# Patient Record
Sex: MALE | Race: WHITE | URBAN - METROPOLITAN AREA
[De-identification: names, ages, dates, MRNs, and addresses within clinical notes are randomized per-mention and may not be internally consistent; named-entity substitution may affect disease eponyms.]

---

## 2017-01-15 ENCOUNTER — ANESTHESIA EVENT (OUTPATIENT)
Dept: EMERGENCY MEDICINE | Facility: CLINIC | Age: 57
End: 2017-01-15
Payer: COMMERCIAL

## 2017-01-15 ENCOUNTER — APPOINTMENT (OUTPATIENT)
Dept: GENERAL RADIOLOGY | Facility: CLINIC | Age: 57
End: 2017-01-15
Attending: EMERGENCY MEDICINE
Payer: COMMERCIAL

## 2017-01-15 ENCOUNTER — HOSPITAL ENCOUNTER (EMERGENCY)
Facility: CLINIC | Age: 57
Discharge: HOME | End: 2017-01-15
Attending: EMERGENCY MEDICINE | Admitting: EMERGENCY MEDICINE
Payer: COMMERCIAL

## 2017-01-15 ENCOUNTER — ANESTHESIA (OUTPATIENT)
Dept: EMERGENCY MEDICINE | Facility: CLINIC | Age: 57
End: 2017-01-15
Payer: COMMERCIAL

## 2017-01-15 VITALS
HEART RATE: 62 BPM | OXYGEN SATURATION: 99 % | SYSTOLIC BLOOD PRESSURE: 110 MMHG | RESPIRATION RATE: 17 BRPM | WEIGHT: 175 LBS | TEMPERATURE: 97.6 F | DIASTOLIC BLOOD PRESSURE: 73 MMHG

## 2017-01-15 DIAGNOSIS — S42.401A CLOSED FRACTURE DISLOCATION OF RIGHT ELBOW, INITIAL ENCOUNTER: ICD-10-CM

## 2017-01-15 PROCEDURE — 25000125 ZZHC RX 250: Performed by: NURSE ANESTHETIST, CERTIFIED REGISTERED

## 2017-01-15 PROCEDURE — 73070 X-RAY EXAM OF ELBOW: CPT | Mod: RT

## 2017-01-15 PROCEDURE — 24600 TX CLSD ELBOW DISLC W/O ANES: CPT

## 2017-01-15 PROCEDURE — 40000671 ZZH STATISTIC ANESTHESIA CASE

## 2017-01-15 PROCEDURE — 96374 THER/PROPH/DIAG INJ IV PUSH: CPT | Mod: 59

## 2017-01-15 PROCEDURE — 37000011 ZZH ANESTHESIA WARD SERVICE: Performed by: NURSE ANESTHETIST, CERTIFIED REGISTERED

## 2017-01-15 PROCEDURE — 40000940 XR ELBOW PORT RT 2 VW: Mod: RT

## 2017-01-15 PROCEDURE — 99284 EMERGENCY DEPT VISIT MOD MDM: CPT | Mod: 25 | Performed by: EMERGENCY MEDICINE

## 2017-01-15 PROCEDURE — 99285 EMERGENCY DEPT VISIT HI MDM: CPT | Mod: 25

## 2017-01-15 PROCEDURE — 25000132 ZZH RX MED GY IP 250 OP 250 PS 637: Performed by: EMERGENCY MEDICINE

## 2017-01-15 PROCEDURE — 25000125 ZZHC RX 250: Performed by: EMERGENCY MEDICINE

## 2017-01-15 PROCEDURE — 24600 TX CLSD ELBOW DISLC W/O ANES: CPT | Performed by: EMERGENCY MEDICINE

## 2017-01-15 PROCEDURE — 25800025 ZZH RX 258: Performed by: NURSE ANESTHETIST, CERTIFIED REGISTERED

## 2017-01-15 RX ORDER — OXYCODONE AND ACETAMINOPHEN 5; 325 MG/1; MG/1
1-2 TABLET ORAL EVERY 6 HOURS PRN
Qty: 24 TABLET | Refills: 0 | Status: SHIPPED | OUTPATIENT
Start: 2017-01-15

## 2017-01-15 RX ORDER — HYDROMORPHONE HYDROCHLORIDE 1 MG/ML
0.5 INJECTION, SOLUTION INTRAMUSCULAR; INTRAVENOUS; SUBCUTANEOUS ONCE
Status: COMPLETED | OUTPATIENT
Start: 2017-01-15 | End: 2017-01-15

## 2017-01-15 RX ORDER — SODIUM CHLORIDE, SODIUM LACTATE, POTASSIUM CHLORIDE, CALCIUM CHLORIDE 600; 310; 30; 20 MG/100ML; MG/100ML; MG/100ML; MG/100ML
INJECTION, SOLUTION INTRAVENOUS CONTINUOUS PRN
Status: DISCONTINUED | OUTPATIENT
Start: 2017-01-15 | End: 2017-01-15

## 2017-01-15 RX ORDER — OXYCODONE AND ACETAMINOPHEN 5; 325 MG/1; MG/1
2 TABLET ORAL ONCE
Status: COMPLETED | OUTPATIENT
Start: 2017-01-15 | End: 2017-01-15

## 2017-01-15 RX ORDER — PROPOFOL 10 MG/ML
INJECTION, EMULSION INTRAVENOUS PRN
Status: DISCONTINUED | OUTPATIENT
Start: 2017-01-15 | End: 2017-01-15

## 2017-01-15 RX ORDER — LIDOCAINE HYDROCHLORIDE 10 MG/ML
INJECTION, SOLUTION INFILTRATION; PERINEURAL PRN
Status: DISCONTINUED | OUTPATIENT
Start: 2017-01-15 | End: 2017-01-15

## 2017-01-15 RX ADMIN — LIDOCAINE HYDROCHLORIDE 30 MG: 10 INJECTION, SOLUTION INFILTRATION; PERINEURAL at 17:30

## 2017-01-15 RX ADMIN — PROPOFOL 50 MG: 10 INJECTION, EMULSION INTRAVENOUS at 17:30

## 2017-01-15 RX ADMIN — SODIUM CHLORIDE, POTASSIUM CHLORIDE, SODIUM LACTATE AND CALCIUM CHLORIDE: 600; 310; 30; 20 INJECTION, SOLUTION INTRAVENOUS at 17:20

## 2017-01-15 RX ADMIN — PROPOFOL 50 MG: 10 INJECTION, EMULSION INTRAVENOUS at 17:31

## 2017-01-15 RX ADMIN — OXYCODONE HYDROCHLORIDE AND ACETAMINOPHEN 2 TABLET: 5; 325 TABLET ORAL at 18:28

## 2017-01-15 RX ADMIN — HYDROMORPHONE HYDROCHLORIDE 0.5 MG: 1 INJECTION, SOLUTION INTRAMUSCULAR; INTRAVENOUS; SUBCUTANEOUS at 18:01

## 2017-01-15 RX ADMIN — PROPOFOL 20 MG: 10 INJECTION, EMULSION INTRAVENOUS at 17:32

## 2017-01-15 NOTE — ED PROVIDER NOTES
History     Chief Complaint   Patient presents with     Arm Pain     fell during [employer] today and injured right elbow with obvious deformity. + pulse below deformity.      HPI  Krishan Gamez is a 56 year old male who fell into the right elbow while playing broom ball a short time ago.  He has pain, swelling and deformity at the elbow.  Sharp, severe, nonradiating pain which is constant and aggravated by any shoulder range of motion.  He is unable to move for use the elbow.  No distal CMS abnormality.  No other acute injury or trauma.  No other acute complaints or concerns.     I have reviewed the Medications, Allergies, Past Medical and Surgical History, and Social History in the Epic system.  There is no problem list on file for this patient.  PMH: CAD and MI  No past surgical history on file.  No current facility-administered medications for this encounter.     Current Outpatient Prescriptions   Medication     oxyCODONE-acetaminophen (PERCOCET) 5-325 MG per tablet     No Known Allergies     Social History   Substance Use Topics     Smoking status: No     Alcohol Use: Not on file     No family history on file.    Review of Systems  As mentioned above in the history present illness.  All other systems were reviewed and are negative.    Physical Exam   BP: 144/86 mmHg  Pulse: 62  Heart Rate: 58  Temp: 97.6  F (36.4  C)  Resp: 16  Weight: 79.379 kg (175 lb)  SpO2: 99 %  Physical Exam   Constitutional: He is oriented to person, place, and time. He appears well-developed and well-nourished. No distress.   HENT:   Head: Normocephalic and atraumatic.   Mouth/Throat: Oropharynx is clear and moist. No oropharyngeal exudate.   Eyes: Conjunctivae and EOM are normal.   Neck: Normal range of motion. Neck supple. No spinous process tenderness present. No tracheal deviation present. No thyromegaly present.   Cardiovascular: Normal rate, regular rhythm, normal heart sounds and intact distal pulses.  Exam reveals no gallop and  "no friction rub.    No murmur heard.  Pulmonary/Chest: Effort normal and breath sounds normal. No respiratory distress. He has no wheezes. He has no rales.   Musculoskeletal: Normal range of motion. He exhibits tenderness ( right elbow pain and swelling with deformity consistent with dislocation; skin intact; distal CMS function intact). He exhibits no edema (no pretibial edema).   Neurological: He is alert and oriented to person, place, and time. He has normal strength. No sensory deficit.   Skin: Skin is warm and dry. No rash noted. He is not diaphoretic. No erythema. No pallor.   Psychiatric: He has a normal mood and affect. His behavior is normal.   Nursing note and vitals reviewed.      ED Course   Orthopedic injury tx  Date/Time: 1/15/2017 5:35 PM  Performed by: BECKY CHIN  Authorized by: BECKY CHIN  Consent: Verbal consent obtained. Written consent obtained.  Risks and benefits: risks, benefits and alternatives were discussed  Consent given by: patient and spouse  Patient understanding: patient states understanding of the procedure being performed  Patient consent: the patient's understanding of the procedure matches consent given  Imaging studies: imaging studies available  Patient identity confirmed: arm band  Time out: Immediately prior to procedure a \"time out\" was called to verify the correct patient, procedure, equipment, support staff and site/side marked as required.  Injury location: elbow  Location details: right elbow  Injury type: dislocation  Dislocation type: posterior  Pre-procedure neurovascular assessment: neurovascularly intact  Local anesthesia used: no  Patient sedated: yes (procedural sedation by the CRNA/anesthesia service)  Sedatives: propofol  Analgesia: see MAR for details  Vitals: Vital signs were monitored during sedation.  Manipulation performed: yes  Reduction method: traction and counter traction  Reduction successful: yes  X-ray confirmed reduction: " yes  Immobilization: splint  Splint type: long arm  Supplies used: Ortho-Glass  Post-procedure neurovascular assessment: post-procedure neurovascularly intact  Patient tolerance: Patient tolerated the procedure well with no immediate complications           I independently reviewed the X-rays: Agree with the Radiologist's interpretation.  Results for orders placed or performed during the hospital encounter of 01/15/17   Elbow XR, 2 views, right    Narrative    ELBOW RIGHT TWO VIEW 1/15/2017 4:46 PM     COMPARISON: None.    HISTORY: Fell during broomball, obvious deformity.      Impression    IMPRESSION: There is posterior dislocation of the right forearm at the  distal right humerus. There is a questionable fracture through the  medial aspect of the proximal end of the right ulna. No other definite  fractures noted.   XR Elbow Port Right 2 Views    Narrative    ELBOW PORTABLE RIGHT TWO VIEW 1/15/2017 6:04 PM     HISTORY: Post-reduction films.    COMPARISON: Earlier same day.    FINDINGS: Overlying splint obscures detail. The elbow dislocation is  realigned.      Impression    IMPRESSION: Realigned elbow dislocation, possibly avulsion fracture  adjacent to the lateral epicondyle. Possible fracture of the proximal  ulna again evident.    ALENA DE LEON MD     I independently reviewed the X-rays: Agree with the Radiologist's interpretation.         Labs Ordered and Resulted from Time of ED Arrival Up to the Time of Departure from the ED - No data to display    Medications   HYDROmorphone (PF) (DILAUDID) injection 0.5 mg (0.5 mg Intravenous Given 1/15/17 1801)   oxyCODONE-acetaminophen (PERCOCET) 5-325 MG per tablet 2 tablet (2 tablets Oral Given 1/15/17 1828)       Assessments & Plan (with Medical Decision Making)   posterclosed posterior right elbow dislocation with fractures of the humeral lateral epicondyle and ulna.  CMS function intact.  Closed reduction was successfully performed under procedural sedation by the  CRNA.  Orthopedic  referral was made.  He was placed in a sling and given instructions for supportive care.  Rx Percocet to use if needed for pain refractory to ibuprofen. Patient was provided instructions for supportive care and will return as needed for worsened condition or worsening symptoms, or new problems or concerns.    I have reviewed the nursing notes.    I have reviewed the findings, diagnosis, plan and need for follow up with the patient.    New Prescriptions    OXYCODONE-ACETAMINOPHEN (PERCOCET) 5-325 MG PER TABLET    Take 1-2 tablets by mouth every 6 hours as needed for moderate to severe pain       Final diagnoses:   Closed fracture dislocation of right elbow, initial encounter       1/15/2017   [facility] EMERGENCY DEPARTMENT      Sukhi Mercedes MD  01/15/17 0217
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
## 2017-01-15 NOTE — ED AVS SNAPSHOT
[facility] [place] [facility]    [address] [address]    Phone:  [phone]    Fax:  [phone]                                       Krishan Gamez   MRN: [id number]    Department:  [facility] [place] [facility]   Date of Visit:  1/15/2017           After Visit Summary Signature Page     I have received my discharge instructions, and my questions have been answered. I have discussed any challenges I see with this plan with the nurse or doctor.    ..........................................................................................................................................  Patient/Patient Representative Signature      ..........................................................................................................................................  Patient Representative Print Name and Relationship to Patient    ..................................................               ................................................  Date                                            Time    ..........................................................................................................................................  Reviewed by Signature/Title    ...................................................              ..............................................  Date                                                            Time

## 2017-01-15 NOTE — ED NOTES
Pt fell landing on left elbow playing [employer].   Obviously deformity to left elbow.   Pt signed consent for left elbow reduction and CRNA in room with pt discussing procedure.

## 2017-01-15 NOTE — ED AVS SNAPSHOT
[facility]    [address] [address]    Phone:  [phone]    Fax:  [phone]                                       Krishan Gamez   MRN: [id number]    Department:  [facility]   Date of Visit:  1/15/2017           Patient Information     Date Of Birth          1960        Your diagnoses for this visit were:     Closed fracture dislocation of right elbow, initial encounter        You were seen by Sukhi Mercedes MD.      Follow-up Information     Follow up with [facility].    Specialty:  EMERGENCY MEDICINE    Why:  If symptoms worsen, or new problems or concerns    Contact information:    [address] [place] [id number] [phone]    Additional information:    The medical center is located at   [address] [place] [address]. (between [address] in [place], [place] miles north   of [place]).        Follow up with Orthopaedics, [facility].    Why:  Referral was made for you, they will call you to arrange follow-up later this week    Contact information:    [address] [place] [id number]  [phone]        Discharge References/Attachments     ELBOW DISLOCATION (ENGLISH)      24 Hour Appointment Hotline       To make an appointment at any [facility] clinic, call [phone] ([phone]). If you don't have a family doctor or clinic, we will help you find one. [facility] clinics are conveniently located to serve the needs of you and your family.          ED Discharge Orders     ORTHO  REFERRAL       [facility] is referring you to the Orthopedic  Services at [facility].       The  Representative will assist you in the coordination of your Orthopedic and Musculoskeletal Care as prescribed by your physician.    The  Representative will call you within 1 business day to help schedule your appointment, or you may  contact the [facility] Representative at:    All areas ~ [phone]     Type of Referral : Surgical / Specialist       Timeframe requested: 3 - 5 days    Coverage of these services is subject to the terms and limitations of your health insurance plan.  Please call member services at your health plan with any benefit or coverage questions.      If X-rays, CT or MRI's have been performed, please contact the facility where they were done to arrange for , prior to your scheduled appointment.  Please bring this referral request to your appointment and present it to your specialist.                     Review of your medicines      START taking        Dose / Directions Last dose taken    oxyCODONE-acetaminophen 5-325 MG per tablet   Commonly known as:  PERCOCET   Dose:  1-2 tablet   Quantity:  24 tablet        Take 1-2 tablets by mouth every 6 hours as needed for moderate to severe pain   Refills:  0                Prescriptions were sent or printed at these locations (1 Prescription)                   Other Prescriptions                Printed at Department/Unit printer (1 of 1)         oxyCODONE-acetaminophen (PERCOCET) 5-325 MG per tablet                Procedures and tests performed during your visit     Elbow XR, 2 views, right    Orthopedic injury treatment    XR Elbow Port Right 2 Views      Orders Needing Specimen Collection     None      Pending Results     Date and Time Order Name Status Description    1/15/2017 1635 Elbow XR, 2 views, right Preliminary             Pending Culture Results     No orders found from 1/14/2017 to 1/16/2017.       Test Results from your hospital stay           1/15/2017  5:00 PM - Interface, Radiant Ib      Narrative     ELBOW RIGHT TWO VIEW 1/15/2017 4:46 PM     COMPARISON: None.    HISTORY: Fell during [facility], obvious deformity.        Impression     IMPRESSION: There is posterior dislocation of the right forearm at the  distal right humerus. There is a questionable  "fracture through the  medial aspect of the proximal end of the right ulna. No other definite  fractures noted.         1/15/2017  6:15 PM - Interface, Radiant Ib      Narrative     ELBOW PORTABLE RIGHT TWO VIEW 1/15/2017 6:04 PM     HISTORY: Post-reduction films.    COMPARISON: Earlier same day.    FINDINGS: Overlying splint obscures detail. The elbow dislocation is  realigned.        Impression     IMPRESSION: Realigned elbow dislocation, possibly avulsion fracture  adjacent to the lateral epicondyle. Possible fracture of the proximal  ulna again evident.    ALENA DE LEON MD                Thank you for choosing [facility]       Thank you for choosing [facility] for your care. Our goal is always to provide you with excellent care. Hearing back from our patients is one way we can continue to improve our services. Please take a few minutes to complete the written survey that you may receive in the mail after you visit with us. Thank you!        [employer] Information     [employer] lets you send messages to your doctor, view your test results, renew your prescriptions, schedule appointments and more. To sign up, go to [email or web address] . Click on \"Log in\" on the left side of the screen, which will take you to the Welcome page. Then click on \"Sign up Now\" on the right side of the page.     You will be asked to enter the access code listed below, as well as some personal information. Please follow the directions to create your username and password.     Your access code is: [email or web address]  Expires: 4/15/2017  6:38 PM     Your access code will  in 90 days. If you need help or a new code, please call your [facility] clinic or [phone].        Care EveryWhere ID     This is your Care EveryWhere ID. This could be used by other organizations to access your [facility] medical records  [id number]        After Visit Summary       This is your record. Keep this with you and show to your community pharmacist(s) and doctor(s) " at your next visit.

## 2017-01-15 NOTE — ED NOTES
fell during [employer] today and injured right elbow with obvious deformity. + pulse below deformity.

## 2017-01-16 NOTE — ANESTHESIA CARE TRANSFER NOTE
Patient: Krishan Gamez    Reduction of Right Elbow Dislocation  Additional Information* No procedures listed *    Diagnosis: * No pre-op diagnosis entered *  Diagnosis Additional Information: No value filed.    Anesthesia Type:   MAC     Note:  Airway :Room Air  Patient transferred to:Emergency Department  Comments: Patient's VSS. Spontaneous respirations. Patient awake and oriented. IV patent. Report to RN.      Vitals: (Last set prior to Anesthesia Care Transfer)              Electronically Signed By: GEORGETTE Reed CRNA  January 15, 2017  6:15 PM

## 2017-01-16 NOTE — ANESTHESIA POSTPROCEDURE EVALUATION
Patient: Krishan Gamez    Reduction of Right Elbow Dislocation  Additional Information* No procedures listed *    Diagnosis:* No pre-op diagnosis entered *  Diagnosis Additional Information: No value filed.    Anesthesia Type:  MAC    Note:  Anesthesia Post Evaluation    Patient location during evaluation: Bedside  Patient participation: Able to fully participate in evaluation  Level of consciousness: awake and alert  Pain management: adequate  Airway patency: patent  Cardiovascular status: acceptable  Respiratory status: acceptable  Hydration status: acceptable  PONV: none     Anesthetic complications: None          Last vitals:  Filed Vitals:    01/15/17 1740 01/15/17 1745 01/15/17 1800   BP: 127/85 107/75 113/71   Pulse:      Temp:      Resp: 20 7 17   SpO2: 99% 99% 99%       Electronically Signed By: GEORGETTE Reed CRNA  January 15, 2017  6:16 PM

## 2017-01-19 ENCOUNTER — OFFICE VISIT (OUTPATIENT)
Dept: ORTHOPEDICS | Facility: CLINIC | Age: 57
End: 2017-01-19
Payer: COMMERCIAL

## 2017-01-19 VITALS — WEIGHT: 175 LBS | HEART RATE: 62 BPM | RESPIRATION RATE: 16 BRPM

## 2017-01-19 DIAGNOSIS — S53.104A: Primary | ICD-10-CM

## 2017-01-19 PROBLEM — M54.12 CERVICAL RADICULOPATHY: Status: ACTIVE | Noted: 2017-01-19

## 2017-01-19 PROCEDURE — 99203 OFFICE O/P NEW LOW 30 MIN: CPT | Performed by: ORTHOPAEDIC SURGERY

## 2017-01-19 RX ORDER — INDOMETHACIN 25 MG/1
25 CAPSULE ORAL
Qty: 30 CAPSULE | Refills: 1 | Status: SHIPPED | OUTPATIENT
Start: 2017-01-19

## 2017-01-19 ASSESSMENT — PAIN SCALES - GENERAL: PAINLEVEL: MODERATE PAIN (5)

## 2017-01-19 NOTE — NURSING NOTE
Chief Complaint   Patient presents with     Trauma     Patient is here for right elbow injury. DOI: 1/15/2017 He fell into the right elbow while playing broom ball        Initial Pulse 62  Resp 16  Wt 79.379 kg (175 lb) There is no height on file to calculate BMI.  BP completed using cuff size: NA (Not Taken)  Rafael Cortez MA

## 2017-01-19 NOTE — PROGRESS NOTES
SUBJECTIVE:  Krishan Gamez is a 56 year old male who is seen at the consultation request of Dr. Mercedes for right elbow injury that occurred 3 days ago. DOI: 1/15/17. He had a posterolateral dislocation of right elbow that was reduced in the ER. Patient presents to the clinic wearing a splint.   Onset: Following acute injury. Mechanism of injury: Fall while playing broom ball.     Present symptoms: swelling where the splint was not wrapped. He had a few hours where he had numbness along thumb that only occurred 3 days ago but has resolved.   Mitigating Factors:  No Treatment tried to date  Symptoms have been intermittent since that time.  Prior history of related problems: no prior problems with this area in the past.    Patients past medical, surgical, social and family histories reviewed. Past medical history notable for: reviewed on the up to date problem list in the chart  PAST MEDICAL HISTORY: No past medical history on file.    PAST SURGICAL HISTORY: No past surgical history on file.    FAMILY HISTORY: No family history on file.    SOCIAL HISTORY:   Social History   Substance Use Topics     Smoking status: Not on file     Smokeless tobacco: Not on file     Alcohol Use: Not on file     REVIEW OF SYSTEMS:  CONSTITUTIONAL:NEGATIVE for fever, chills, change in weight  INTEGUMENTARY/SKIN: NEGATIVE for worrisome rashes, moles or lesions  MUSCULOSKELETAL:See HPI above  NEURO: NEGATIVE for weakness, dizziness or paresthesias    EXAM:  Pulse 62  Resp 16  Wt 79.379 kg (175 lb)  GENERAL APPEARANCE: healthy, alert and no distress   GAIT: NORMAL  SKIN: no suspicious lesions or rashes  NEURO: normal strength and tone, sentation intact, reflexes normal, DTR symmetrically normal in upper extremities and DTR symmetrically normal in lower extremities  PSYCH:  mentation appears normal and affect normal/bright    MUSCULOSKELETAL:  RIGHT ELBOW:  Splint was removed  Inspection: significant swelling of the elbow  Tender: lateral  epicondyle, anteromedial elbow just anterior to the medial epicondyle   Range of Motion: 45 degrees of extension and 100 degrees of flexion   Mild valgus laxity   With gentle testing he appears to have some lateral varus laxity.   Strength: good wrist dorsiflexor strength with no pain over the lateral epicondyle   Pain with resisted pronation    X-RAY INTEPRETATION: Obtained 1/15/17 of the RIGHT ELBOW PORT: 2-views, reviewed in the office with the patient by myself today and show a posterolateral dislocation, with subsequent reduction.  there is a small avulsion off the radial side presumably from the lateral epicondyle. There may be a small fracture off the nonarticular portion of the medial condyle of the elbow.     ASSESSMENT:  1. Elbow dislocation with likely posterolateral instability    PLAN:  We discussed the treatment options. I recommended wearing a sling full-time with hinged elbow brace wear. All questions were answered. The patient understands and has elected to proceed.   Hinged elbow brace ordered.   Until he gets the brace he will wear a sling and remove it throughout the day for gentle ROM exercises.  Tylenol as needed at recommended dosage.   Ice as needed.   Activity restrictions: No lifting, pushing with right hand.     Meds:  Prescription: Indocin ordered to prevent heterotopic ossification.     Return to clinic in 2 weeks.    SHANICE Bowen MD  Dept. Orthopedic Surgery  [facility]    This document serves as a record of the services and decisions personally performed and made by Dr. SHANICE Bowen MD. It was created on his behalf by Melissa Inman, a trained medical scribe. The creation of this record is based on the provider's personal observations and the statements of the patient. This document has been checked and approved by the attending provider.   Melissa Inman 2:55 PM 1/19/2017

## 2017-01-19 NOTE — MR AVS SNAPSHOT
After Visit Summary   1/19/2017    Krishan Gamez    MRN: [id number]           Patient Information     Date Of Birth          1960        Visit Information        Provider Department      1/19/2017 2:45 PM Dinesh Bowen MD [facility]        Care Instructions    Please remember to call and schedule a follow up appointment if one was recommended at your earliest convenience.   Orthopedics CLINIC HOURS TELEPHONE NUMBER   Dinesh Bowen M.D.      Rafael Cortez  Medical Assistant Tuesday 8 am -5 pm    Wednesday 8 am -1 pm    Thursday 8 am -5 pm   Specialty schedulers:   [phone] to make an appointment with any Specialty Provider.   Main Clinic:   [phone] to make an appointment with your primary provider   Urgent Care locations:    [facility] Monday-Friday 5 pm - 9 pm  Saturday-Sunday 9 am -5pm      Monday-Friday 11 am - 9 pm  Saturday 9 am - 5 pm [phone]     If SURGERY has been recommended, please call our Specialty Schedulers at [phone] to schedule your procedure.    If you need a medication refill, please contact your pharmacy. Please allow 3 business days for your refill to be completed.  Use [employer] Software (secure e-mail communication and access to your chart) to send a message or to make an appointment. Please ask how you can sign up for [email or web address].        Follow-ups after your visit        Who to contact     If you have questions or need follow up information about today's clinic visit or your schedule please contact [facility] directly at [phone].  Normal or non-critical lab and imaging results will be communicated to you by MyChart, letter or phone within 4 business days after the clinic has received the results. If you do not hear from us within 7 days, please contact the clinic through Welcome Fundshart or phone. If you have a critical or abnormal lab result, we will notify you by phone as soon as  "possible.  Submit refill requests through Microarrays or call your pharmacy and they will forward the refill request to us. Please allow 3 business days for your refill to be completed.          Additional Information About Your Visit        Microarrays Information     Microarrays lets you send messages to your doctor, view your test results, renew your prescriptions, schedule appointments and more. To sign up, go to [email or web address] . Click on \"Log in\" on the left side of the screen, which will take you to the Welcome page. Then click on \"Sign up Now\" on the right side of the page.     You will be asked to enter the access code listed below, as well as some personal information. Please follow the directions to create your username and password.     Your access code is: [email or web address]  Expires: 4/15/2017  6:38 PM     Your access code will  in 90 days. If you need help or a new code, please call your [place] clinic or [phone].        Care EveryWhere ID     This is your Care EveryWhere ID. This could be used by other organizations to access your [place] medical records  [id number]        Your Vitals Were     Pulse Respirations                62 16           Blood Pressure from Last 3 Encounters:   01/15/17 110/73    Weight from Last 3 Encounters:   17 79.379 kg (175 lb)   01/15/17 79.379 kg (175 lb)              Today, you had the following     No orders found for display       Primary Care Provider    Pcp Unknown Verified       No address on file        Thank you!     Thank you for [facility]  [facility]. Our goal is always to provide you with excellent care. Hearing back from our patients is one way we can continue to improve our services. Please take a few minutes to complete the written survey that you may receive in the mail after your visit with us. Thank you!             Your Updated Medication List - Protect others around you: Learn how to safely use, store and throw " away your medicines at www.disposemymeds.org.          This list is accurate as of: 1/19/17  2:50 PM.  Always use your most recent med list.                   Brand Name Dispense Instructions for use    oxyCODONE-acetaminophen 5-325 MG per tablet    PERCOCET    24 tablet    Take 1-2 tablets by mouth every 6 hours as needed for moderate to severe pain

## 2017-01-19 NOTE — PATIENT INSTRUCTIONS
Please remember to call and schedule a follow up appointment if one was recommended at your earliest convenience.   Orthopedics CLINIC HOURS TELEPHONE NUMBER   Dinesh Bowen M.D.      Rafael Cortez  Medical Assistant Tuesday 8 am -5 pm    Wednesday 8 am -1 pm    Thursday 8 am -5 pm   Specialty schedulers:   [phone] to make an appointment with any Specialty Provider.   Main Clinic:   [phone] to make an appointment with your primary provider   Urgent Care locations:    [facility] Monday-Friday 5 pm - 9 pm  Saturday-Sunday 9 am -5pm      Monday-Friday 11 am - 9 pm  Saturday 9 am - 5 pm [phone]     If SURGERY has been recommended, please call our Specialty Schedulers at [phone] to schedule your procedure.    If you need a medication refill, please contact your pharmacy. Please allow 3 business days for your refill to be completed.  Use [email or web address] (secure e-mail communication and access to your chart) to send a message or to make an appointment. Please ask how you can sign up for Tervela.

## 2017-02-07 NOTE — PROGRESS NOTES
HISTORY OF PRESENT ILLNESS:    Krishan Gamez is a 56 year old male who is seen in follow up for his 1/15/17 right elbow dislocation. He had a posterolateral dislocation of right elbow that was reduced in the ER. He has not received the hinged elbow brace. He has been using the brace and doing gentle ROM exercises.     He reports that the Indocin caused him to have some difficulty sleeping.     It has been approximately 3 weeks since the injury.   Onset: Following acute injury. Mechanism of injury: Fall while playing broom ball.     PHYSICAL EXAM:  Pulse 79  Resp 16  Wt 79.379 kg (175 lb)  There is no height on file to calculate BMI.   GENERAL APPEARANCE: healthy, alert and no distress   SKIN: no suspicious lesions or rashes  NEURO: Normal strength and tone, mentation intact and speech normal  VASCULAR:  good pulses, and cappillary refill   LYMPH: no lymphadenopathy   PSYCH:  mentation appears normal and affect normal/bright    MSK:  ROM: 10 short of full extension and 105 degrees of flexion ; full pronation and supination  Fairly good stability with varus and valgus stress.   Tender: lateral epicondyle.  Non-tender over radial head and medial structures  Good  strength     Impression:   1. Right posterolateral elbow dislocation doing well    Plan:   Discontinue Indocin and switch to Aleve to prevent heterotopic ossification.   Discontinue sling but still be cautious with it.   Accelerate ROM exercises as tolerated.   Physical therapy reordered.     Return to clinic in 4 weeks/ PRN    SHANICE Bowen MD  Dept. Orthopedic Surgery  [facility]     This document serves as a record of the services and decisions personally performed and made by Dr. SHANICE Bowen MD. It was created on his behalf by Melissa Inman, a trained medical scribe. The creation of this record is based on the provider's personal observations and the statements of the patient. This document has been checked and approved by the  attending provider.   Melissa Inman 10:05 AM 2/8/2017

## 2017-02-08 ENCOUNTER — OFFICE VISIT (OUTPATIENT)
Dept: ORTHOPEDICS | Facility: CLINIC | Age: 57
End: 2017-02-08
Payer: COMMERCIAL

## 2017-02-08 VITALS — HEART RATE: 79 BPM | RESPIRATION RATE: 16 BRPM | WEIGHT: 175 LBS

## 2017-02-08 DIAGNOSIS — S53.104D ELBOW DISLOCATION, RIGHT, SUBSEQUENT ENCOUNTER: Primary | ICD-10-CM

## 2017-02-08 PROCEDURE — 99212 OFFICE O/P EST SF 10 MIN: CPT | Performed by: ORTHOPAEDIC SURGERY

## 2017-02-08 ASSESSMENT — PAIN SCALES - GENERAL: PAINLEVEL: MODERATE PAIN (4)

## 2017-02-08 NOTE — NURSING NOTE
Chief Complaint   Patient presents with     RECHECK     follow up for his 1/15/17 right elbow injury.. He had a posterolateral dislocation of right elbow that was reduced in the ER.       Initial Pulse 79  Resp 16  Wt 79.379 kg (175 lb) There is no height on file to calculate BMI.  Medication Reconciliation: complete     Rafael Cortez MA

## 2017-02-08 NOTE — PATIENT INSTRUCTIONS
Please remember to call and schedule a follow up appointment if one was recommended at your earliest convenience.   Orthopedics CLINIC HOURS TELEPHONE NUMBER   Dinesh Bowen M.D.      Rafael Cortez  Medical Assistant Tuesday 8 am -5 pm    Wednesday 8 am -1 pm    Thursday 8 am -5 pm   Specialty schedulers:   [phone] to make an appointment with any Specialty Provider.   Main Clinic:   [phone] to make an appointment with your primary provider   Urgent Care locations:    [facility] Monday-Friday 5 pm - 9 pm  Saturday-Sunday 9 am -5pm      Monday-Friday 11 am - 9 pm  Saturday 9 am - 5 pm [phone]     If SURGERY has been recommended, please call our Specialty Schedulers at [phone] to schedule your procedure.    If you need a medication refill, please contact your pharmacy. Please allow 3 business days for your refill to be completed.  Use Upvertert (secure e-mail communication and access to your chart) to send a message or to make an appointment. Please ask how you can sign up for GREE.

## 2017-02-08 NOTE — MR AVS SNAPSHOT
After Visit Summary   2/8/2017    Krishan Gamez    MRN: [id number]           Patient Information     Date Of Birth          1960        Visit Information        Provider Department      2/8/2017 9:30 AM Dinesh Bowen MD [facility]        Care Instructions    Please remember to call and schedule a follow up appointment if one was recommended at your earliest convenience.   Orthopedics CLINIC HOURS TELEPHONE NUMBER   Dinesh Bowen M.D.      Rafael Cortez  Medical Assistant Tuesday 8 am -5 pm    Wednesday 8 am -1 pm    Thursday 8 am -5 pm   Specialty schedulers:   [phone] to make an appointment with any Specialty Provider.   Main Clinic:   [phone] to make an appointment with your primary provider   Urgent Care locations:    [facility] Monday-Friday 5 pm - 9 pm  Saturday-Sunday 9 am -5pm      Monday-Friday 11 am - 9 pm  Saturday 9 am - 5 pm [phone]     If SURGERY has been recommended, please call our Specialty Schedulers at [phone] to schedule your procedure.    If you need a medication refill, please contact your pharmacy. Please allow 3 business days for your refill to be completed.  Use FND (secure e-mail communication and access to your chart) to send a message or to make an appointment. Please ask how you can sign up for IDbyMEt.        Follow-ups after your visit        Who to contact     If you have questions or need follow up information about today's clinic visit or your schedule please contact [facility] directly at [phone].  Normal or non-critical lab and imaging results will be communicated to you by MyChart, letter or phone within 4 business days after the clinic has received the results. If you do not hear from us within 7 days, please contact the clinic through Jobzellahart or phone. If you have a critical or abnormal lab result, we will notify you by phone as soon as  "possible.  Submit refill requests through Manta or call your pharmacy and they will forward the refill request to us. Please allow 3 business days for your refill to be completed.          Additional Information About Your Visit        Manta Information     Manta lets you send messages to your doctor, view your test results, renew your prescriptions, schedule appointments and more. To sign up, go to [email or web address] . Click on \"Log in\" on the left side of the screen, which will take you to the Welcome page. Then click on \"Sign up Now\" on the right side of the page.     You will be asked to enter the access code listed below, as well as some personal information. Please follow the directions to create your username and password.     Your access code is: [email or web address]  Expires: 4/15/2017  6:38 PM     Your access code will  in 90 days. If you need help or a new code, please call your [place] clinic or [phone].        Care EveryWhere ID     This is your Care EveryWhere ID. This could be used by other organizations to access your [place] medical records  [id number]        Your Vitals Were     Pulse Respirations                79 16           Blood Pressure from Last 3 Encounters:   01/15/17 110/73    Weight from Last 3 Encounters:   17 79.379 kg (175 lb)   17 79.379 kg (175 lb)   01/15/17 79.379 kg (175 lb)              Today, you had the following     No orders found for display       Primary Care Provider    Pcp Unknown Verified       No address on file        Thank you!     [facility] you [facility]. Our goal is always to provide you with excellent care. Hearing back from our patients is one way we can continue to improve our services. Please take a few minutes to complete the written survey that you may receive in the mail after your visit with us. Thank you!             Your Updated Medication List - Protect others around you: Learn how to " safely use, store and throw away your medicines at www.disposemymeds.org.          This list is accurate as of: 2/8/17 10:08 AM.  Always use your most recent med list.                   Brand Name Dispense Instructions for use    indomethacin 25 MG capsule    INDOCIN    30 capsule    Take 1 capsule (25 mg) by mouth 3 times daily (with meals)       oxyCODONE-acetaminophen 5-325 MG per tablet    PERCOCET    24 tablet    Take 1-2 tablets by mouth every 6 hours as needed for moderate to severe pain

## 2017-02-13 ENCOUNTER — HOSPITAL ENCOUNTER (OUTPATIENT)
Dept: OCCUPATIONAL THERAPY | Facility: CLINIC | Age: 57
Setting detail: THERAPIES SERIES
End: 2017-02-13
Attending: ORTHOPAEDIC SURGERY
Payer: COMMERCIAL

## 2017-02-13 PROCEDURE — G8985 CARRY GOAL STATUS: HCPCS | Mod: GO,CJ | Performed by: OCCUPATIONAL THERAPIST

## 2017-02-13 PROCEDURE — 97110 THERAPEUTIC EXERCISES: CPT | Mod: GO | Performed by: OCCUPATIONAL THERAPIST

## 2017-02-13 PROCEDURE — 97165 OT EVAL LOW COMPLEX 30 MIN: CPT | Mod: GO | Performed by: OCCUPATIONAL THERAPIST

## 2017-02-13 PROCEDURE — G8986 CARRY D/C STATUS: HCPCS | Mod: GO,CJ | Performed by: OCCUPATIONAL THERAPIST

## 2017-02-13 PROCEDURE — G8984 CARRY CURRENT STATUS: HCPCS | Mod: GO,CJ | Performed by: OCCUPATIONAL THERAPIST

## 2017-02-13 PROCEDURE — 40000839 ZZH STATISTIC HAND THERAPY VISIT: Performed by: OCCUPATIONAL THERAPIST

## 2017-02-13 PROCEDURE — 97535 SELF CARE MNGMENT TRAINING: CPT | Mod: GO | Performed by: OCCUPATIONAL THERAPIST

## 2017-02-13 NOTE — PROGRESS NOTES
02/13/17   Hand Therapy Evaluation   General Information/History   Start Of Care Date 02/13/17   Referring Physician Dr. Bowen   Orders Evaluate And Treat As Indicated   Orders Date 02/08/17   Medical Diagnosis Right elbow dislocatin   Additional Occupational Profile Info/Pertinent history of current problem Patient reports he fell on his right elbow playing iZotope on January 15- 2017. He wore a sling for a few weeks and h been moving elbow. He reports he can not fully straiten or bend elbow and had pain in posterior elbow.   Previous treatment or current condition wearing sling   Past medical history see chart   How/Where did it occur With a fall;During recreation/sports   Onset date of current episode/exacerbation 01/15/17   Chronicity New   Hand Dominance Right   Affected side Right   Functional limitations perform activities of daily living;perform required work activities;perform desired leisure / sports activities   Reported Symptoms Pain;Loss of Motion/Stiffness   Prior level of function Independent ADL   Important Activities playing iZotope, biking, doing pull ups, cross country skiing, throwing balls frisbees ect   Patient role/Employment history Employed   Occupation admin/teaching   Employment Status Working in normal job without restrictions   Primary Job Tasks Keyboarding;Lifting;Carrying;Using a mouse;Driving   Patient/Family goals statement Patient would like to return to prior functional status to resume important activities s stated above.   Pain   Pain Primary Pain Report   Primary Pain Report   Location posterior elbow and anterior   Pain Quality Aching   Frequency Intermittent   Scale 0/10;7/10   Pain Is Exacerbated By Lifting;Twisting , Pulling  (reaching)   Pain Is Relieved By Immobilization   Progression Since Onset Gradually Improving   Edema   Edema Elbow Crease   Elbow Crease (measured in cm)   Elbow Crease -  - Left 27   Elbow Crease -  - Right 29   ROM   ROM AROM   AROM   AROM  Elbow   Elbow   Elbow Hyperextension- Left 10   Elbow Extension - Right 13   Elbow Flexion- Left 152   Elbow Flexion - Right 132   Strength   Strength Strength    Avg - Left 92#    Avg - Right 82#   Education Assessment   Preferred Learning Style Demonstration   Barriers to Learning No barriers   Therapy Interventions   Planned Therapy Interventions ROM;Stretching;Manual Therapy;Self Care/Home Management;Home Program   Clinical Impression   Criteria for Skilled Therapeutic Interventions Met yes   OT Diagnosis y   Influenced by the following impairments Pain;Edema;Decreased range of motion;Decreased strength   Assessment of Occupational Performance 1-3 Performance Deficits   Identified Performance Deficits lifting, recreational activies and dressing   Clinical Decision Making (Complexity) Low complexity   Therapy Frequency 1x   Predicted Duration of Therapy Intervention (days/wks) 1 week   Risks and Benefits of Treatment have been explained. Yes   Patient, Family & other staff in agreement with plan of care Yes   Clinical Impression Comments Patient presents with above problems and functional impairments. [place] be leaving the country [place] so can see him up for follow up at that time if needed.   Hand Goals   Hand Goals 1x/Visit   1x/Visit   Current Functional Task Managing symptoms   Current Performance Level Limited   Goal Target Task (Ind with HEP)   Goal Target Performance Level No difficulty   Due Date 02/13/17   Date Goal Met 02/13/17   Total Evaluation Time   Aileen Pierce OTR/L CHT  Occupational Therapist, Certified Hand Therapist

## (undated) RX ORDER — LIDOCAINE HYDROCHLORIDE 10 MG/ML
INJECTION, SOLUTION EPIDURAL; INFILTRATION; INTRACAUDAL; PERINEURAL
Status: DISPENSED
Start: 2017-01-15

## (undated) RX ORDER — PROPOFOL 10 MG/ML
INJECTION, EMULSION INTRAVENOUS
Status: DISPENSED
Start: 2017-01-15